# Patient Record
Sex: MALE | Race: OTHER | URBAN - METROPOLITAN AREA
[De-identification: names, ages, dates, MRNs, and addresses within clinical notes are randomized per-mention and may not be internally consistent; named-entity substitution may affect disease eponyms.]

---

## 2023-12-04 ENCOUNTER — EMERGENCY (EMERGENCY)
Facility: HOSPITAL | Age: 68
LOS: 0 days | Discharge: HOME | End: 2023-12-05
Attending: EMERGENCY MEDICINE
Payer: COMMERCIAL

## 2023-12-04 VITALS
SYSTOLIC BLOOD PRESSURE: 136 MMHG | RESPIRATION RATE: 18 BRPM | DIASTOLIC BLOOD PRESSURE: 60 MMHG | OXYGEN SATURATION: 98 % | HEART RATE: 100 BPM | TEMPERATURE: 98 F

## 2023-12-04 DIAGNOSIS — S43.005A UNSPECIFIED DISLOCATION OF LEFT SHOULDER JOINT, INITIAL ENCOUNTER: ICD-10-CM

## 2023-12-04 DIAGNOSIS — M25.512 PAIN IN LEFT SHOULDER: ICD-10-CM
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
## 2023-12-04 PROCEDURE — 96374 THER/PROPH/DIAG INJ IV PUSH: CPT | Mod: XU

## 2023-12-04 PROCEDURE — 73060 X-RAY EXAM OF HUMERUS: CPT | Mod: LT

## 2023-12-04 PROCEDURE — 99284 EMERGENCY DEPT VISIT MOD MDM: CPT | Mod: 25

## 2023-12-04 PROCEDURE — 96375 TX/PRO/DX INJ NEW DRUG ADDON: CPT | Mod: XU

## 2023-12-04 PROCEDURE — 73030 X-RAY EXAM OF SHOULDER: CPT | Mod: LT

## 2023-12-04 PROCEDURE — 23650 CLTX SHO DSLC W/MNPJ WO ANES: CPT | Mod: LT

## 2023-12-04 PROCEDURE — 99285 EMERGENCY DEPT VISIT HI MDM: CPT

## 2023-12-04 PROCEDURE — 73030 X-RAY EXAM OF SHOULDER: CPT | Mod: 26,LT

## 2023-12-04 PROCEDURE — 73060 X-RAY EXAM OF HUMERUS: CPT | Mod: 26,LT

## 2023-12-04 RX ORDER — IBUPROFEN 200 MG
1 TABLET ORAL
Qty: 30 | Refills: 0
Start: 2023-12-04

## 2023-12-04 RX ORDER — CYCLOBENZAPRINE HYDROCHLORIDE 10 MG/1
1 TABLET, FILM COATED ORAL
Qty: 15 | Refills: 0
Start: 2023-12-04

## 2023-12-04 RX ORDER — MORPHINE SULFATE 50 MG/1
4 CAPSULE, EXTENDED RELEASE ORAL ONCE
Refills: 0 | Status: DISCONTINUED | OUTPATIENT
Start: 2023-12-04 | End: 2023-12-04

## 2023-12-04 RX ADMIN — Medication 1 MILLIGRAM(S): at 13:26

## 2023-12-04 RX ADMIN — MORPHINE SULFATE 4 MILLIGRAM(S): 50 CAPSULE, EXTENDED RELEASE ORAL at 13:37

## 2023-12-04 NOTE — ED PROVIDER NOTE - ADDITIONAL NOTES AND INSTRUCTIONS:
Christiano Ulloa is medically excused from heavy lifting, manual labor, until otherwise cleared by follow-up doctor eval.

## 2023-12-04 NOTE — ED PROVIDER NOTE - NSFOLLOWUPINSTRUCTIONS_ED_ALL_ED_FT
Keep shoulder immobilizer on, in place, clean & DRY.  Ibuprofen 600 mg 1 tab with some food 3 x a day as needed for pain.  Pain medication every 6 hours as needed for extra pain.  Follow up with own orthopedist or Dr. Beaver.  Cyclobenzaprine 1 tab every 6 hours as needed for muscle spasm.      Shoulder Dislocation  Three images of the anatomy of the shoulder. One is normal. The other two are dislocated.  Your shoulder joint is made up of 3 bones:  The upper arm bone (humerus).  The shoulder blade (scapula).  The collarbone (clavicle).  A shoulder dislocation happens when your upper arm bone moves out of its normal place in your shoulder joint.    What are the causes?  This condition is often caused by:  A fall.  A hard, direct hit to the shoulder.  A forceful movement of the shoulder.  What increases the risk?  You are more likely to develop this condition if you play sports.    What are the signs or symptoms?  The upper body showing a dislocated shoulder.  Bad shape (deformity) of the shoulder.  Very bad pain.  A shoulder that you cannot move.  Numbness, weakness, or tingling in your neck or down your arm.  Bruising or swelling around your shoulder.  How is this treated?  This condition is treated with a procedure called a reduction. This is done to place the upper arm bone back in the joint. There are two types of reduction:  Closed reduction. The upper arm bone is placed back in the joint without surgery. The doctor uses his or her hands to guide the bone back into place.  Open reduction. Surgery is done to place the upper arm bone back in the joint. This may be needed if:  You have a weak shoulder joint or weak tissues that connect bones to each other (ligaments).  You have had more than one shoulder dislocation.  The nerves or blood vessels around your shoulder have been damaged.  After the procedure, you will wear a brace or sling to prevent the arm from moving.    After the brace or sling is removed, you will have physical therapy to help improve movement (range of motion) in your shoulder joint.    Follow these instructions at home:  Medicines    Take over-the-counter and prescription medicines only as told by your doctor.  Ask your doctor if the medicine prescribed to you:  Requires you to avoid driving or using machinery.  Can cause trouble pooping (constipation). You may need to take steps to prevent or treat trouble pooping:  Drink enough fluid to keep your pee (urine) pale yellow.  Take over-the-counter or prescription medicines.  Eat foods that are high in fiber. These include beans, whole grains, and fresh fruits and vegetables.  Limit foods that are high in fat and sugar. These include fried or sweet foods.  If you have a brace or sling:    Wear the brace or sling as told by your doctor. Remove it only as told by your doctor.  Loosen the brace or sling if your fingers:  Tingle.  Become numb.  Turn cold or blue.  Keep the brace or sling clean.  If the brace or sling is not waterproof:  Do not let it get wet.  Cover it with a watertight covering when you take a bath or shower.  Managing pain, stiffness, and swelling    Bag of ice on a towel on the skin.  If told, put ice on the injured area.  If you can remove your brace or sling, remove it as told by your doctor.  Put ice in a plastic bag.  Place a towel between your skin and the bag.  Leave the ice on for 20 minutes, 2–3 times per day.  Move your fingers often.  Raise (elevate) the injured area above the level of your heart while you are sitting or lying down.  Activity    Do not lift your arm above shoulder level until your doctor approves.  Do not lift anything until your doctor says that it is safe.  Do not push or pull things until your doctor approves.  Return to your normal activities as told by your doctor. Ask your doctor what activities are safe for you.  Do range-of-motion exercises only as told by your doctor.  Exercise your hand by squeezing a soft ball. This keeps your hand and wrist from getting stiff and swollen.  General instructions    Do not drive while you are wearing a brace or sling on a hand that you use for driving.  Do not take baths, swim, or use a hot tub until your doctor approves. Ask your doctor if you may take showers. You may only be allowed to take sponge baths.  Do not use any tobacco products, including cigarettes, chewing tobacco, or e-cigarettes. These can delay healing. If you need help quitting, ask your doctor.  Keep all follow-up visits as told by your doctor. This is important.  Contact a doctor if:  Your brace or sling gets damaged.  Get help right away if:  Your pain gets worse, not better.  You lose feeling in your arm or hand.  Your arm or hand turns white and cold.  Summary  A shoulder dislocation happens when your upper arm bone moves out of its normal place in your shoulder joint.  It is often caused by a fall, a strong hit to the shoulder, or a forceful movement of the shoulder.  It causes very bad pain. You may not be able to move your shoulder.  This condition is treated with either closed or open reduction. You will also be given a brace or sling. You will do exercises to improve movement in your shoulder joint.  Contact a doctor if your brace or sling gets damaged. Get help right away if your pain gets worse, you lose feeling in your arm or hand, or your arm or hand turns white or cold.  This information is not intended to replace advice given to you by your health care provider. Make sure you discuss any questions you have with your health care provider.

## 2023-12-04 NOTE — ED STATDOCS - BIRTH SEX
Health Maintenance Due   Topic Date Due    Shingles Vaccine (2 of 3) 07/22/2016    Eye Exam  03/21/2019    Influenza Vaccine (1) 08/01/2020     Updates were requested from care everywhere.  Chart was reviewed for overdue Proactive [facility] Encounters ([facility]ALEES[facility]) topics (CRS, Breast Cancer Screening, Eye exam)  Health Maintenance has been updated.  LINKS immunization registry triggered.  Immunizations were reconciled.       Male

## 2023-12-04 NOTE — ED STATDOCS - CLINICAL SUMMARY MEDICAL DECISION MAKING FREE TEXT BOX
Patient fell approximately 8-10 feet. Denies any LOC, no other complaints. Will XR shoulder and humerus and reassess.

## 2023-12-04 NOTE — ED PROVIDER NOTE - CARE PROVIDER_API CALL
Jeremy Beaver.  Orthopaedic Surgery  [address] [place] [address]  Phone: [phone]  Fax: [phone]  Follow Up Time:    Jeremy Beaver.  Orthopaedic Surgery  [address] [place] [address]  Phone: [phone]  Fax: [phone]  Follow Up Time:

## 2023-12-04 NOTE — ED PROVIDER NOTE - OBJECTIVE STATEMENT
69 yo male presents to the ED s/p fall from ladder 8-10 feet. Pt was coming down the ladder when he fell. Pt is left handed. Pt landed on his shoulder and is c/o left shoulder pain. Denies LOC. No other complaints at this time.      used, id# [id number] 67 yo male presents to the ED s/p fall from ladder 8-10 feet. Pt was coming down the ladder when he fell. Pt is left handed. Pt landed on his shoulder and is c/o left shoulder pain. Denies LOC. No other complaints at this time.      used, id# [id number]

## 2023-12-04 NOTE — ED ADULT NURSE NOTE - OBJECTIVE STATEMENT
fall from ladder 8-10 feet. Pt was coming down the ladder when he fell. Pt landed on his shoulder and is c/o left shoulder pain. Denies LOC    Extremity reduced bedside with orthopedics, subsequently placed into sling.

## 2023-12-04 NOTE — ED PROVIDER NOTE - PATIENT PORTAL LINK FT
You can access the FollowMyHealth Patient Portal offered by [facility] by registering at the following website: [email or web address]. By joining [employer]’s FollowMyHealth portal, you will also be able to view your health information using other applications (apps) compatible with our system. You can access the FollowMyHealth Patient Portal offered by [facility] by registering at the following website: [email or web address]. By joining [employer]’s FollowMyHealth portal, you will also be able to view your health information using other applications (apps) compatible with our system.

## 2023-12-04 NOTE — ED ADULT NURSE NOTE - NSFALLRISKINTERV_ED_ALL_ED
Communicate fall risk and risk factors to all staff, patient, and family/Provide visual cue: yellow wristband, yellow gown, etc/Reinforce activity limits and safety measures with patient and family/Call bell, personal items and telephone in reach/Instruct patient to call for assistance before getting out of bed/chair/stretcher/Non-slip footwear applied when patient is off stretcher/[place] to call system/Physically safe environment - no spills, clutter or unnecessary equipment/Purposeful Proactive Rounding/Room/bathroom lighting operational, light cord in reach Communicate fall risk and risk factors to all staff, patient, and family/Provide visual cue: yellow wristband, yellow gown, etc/Reinforce activity limits and safety measures with patient and family/Call bell, personal items and telephone in reach/Instruct patient to call for assistance before getting out of bed/chair/stretcher/Non-slip footwear applied when patient is off stretcher/[place] to call system/Physically safe environment - no spills, clutter or unnecessary equipment/Purposeful Proactive Rounding/Room/bathroom lighting operational, light cord in reach

## 2023-12-04 NOTE — ED STATDOCS - OBJECTIVE STATEMENT
67 y/o male with PMHx of HTN presents to the ED c/o left shoulder pain s/p fall from ladder outside of house, landing on his left shoulder. Patient is ambulatory, denies headstrike or blood thinner use. Denies any other pain. 69 y/o male with PMHx of HTN presents to the ED c/o left shoulder pain s/p fall from ladder outside of house, landing on his left shoulder. Patient is ambulatory, denies headstrike or blood thinner use. Denies any other pain.

## 2023-12-04 NOTE — ED STATDOCS - MUSCULOSKELETAL, MLM
+Tenderness in the LUE, none in the wrist or elbow. +Tenderness in the shoulder. Decreased ROM secondary to pain. NVI. Spine negative, lower extremities negative,

## 2023-12-04 NOTE — ED ADULT TRIAGE NOTE - CHIEF COMPLAINT QUOTE
Patient presents to the ER with complaints of fall from ladder outside of house. Patient ambulatory stating he fell on his left shoulder, pain to shoulder and left hip. Patient denies headstrike, denies blood thinner.

## 2023-12-04 NOTE — CONSULT NOTE ADULT - SUBJECTIVE AND OBJECTIVE BOX
68M RHD manual  presents with left shoulder pain after fall from ladder at work earlier today. 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 Patient denies headstrike or LOC. Patient noted immediate pain after the injury and inability to range the shoulder. The patient subsequently came to the ED for further evaluation and management. In the ED, the patient endorses pain of the shoulder and inability to range the shoulder. Patient denies numbness, tingling, weakness, or any other orthopaedic complaint.     Vital Signs Last 24 Hrs  T(C): 36.8 (04 Dec 2023 11:20), Max: 36.8 (04 Dec 2023 11:20)  T(F): 98.2 (04 Dec 2023 11:20), Max: 98.2 (04 Dec 2023 11:20)  HR: 100 (04 Dec 2023 11:20) (100 - 100)  BP: 136/60 (04 Dec 2023 11:20) (136/60 - 136/60)  BP(mean): --  RR: 18 (04 Dec 2023 11:20) (18 - 18)  SpO2: 98% (04 Dec 2023 11:20) (98% - 98%)    Parameters below as of 04 Dec 2023 11:20  Patient On (Oxygen Delivery Method): room air      Gen: Resting in bed, NAD  L UE:  Skin intact, + sulcus sign,   SILT radial/median/ulnar/axillary  +AIN/PIN/Ulnar/Radial/Musc/Median,   +elbow/wrist ROM,   shoulder ROM limited 2/2 pain,   +radial pulse, soft compartments.    Secondary Assessment:  NC/AT, NTTP of clavicles, NTTP of C-spine,T-spine, or L-spine in the midline and paraspinal areas; NTTP of pelvis  RUE: NTTP of Shoulder, Elbow, Wrist, Hand; NT with AROM/PROM of Shoulder, Elbow, Wrist, Hand; AIN/PIN/Med/Uln/Msc/Rad/Ax intact   LLE: Able to SLR, NT with Log Roll, NT with Heel Strike, NTTP of Hip, Knee, Ankle, Foot; NT with AROM/PROM of Hip, Knee, Ankle, Foot; Q/H/GSC/TA/EHL/FHL intact  RLE: Able to SLR, NT with Log Roll, NT with Heel Strike, NTTP of Hip, Knee, Ankle, Foot; NT with AROM/PROM of Hip, Knee, Ankle, Foot; Q/H/GSC/TA/EHL/FHL intact    Imaging: Xray imaging of the L shoulder reviewed demonstrating shoulder dislocation.     Procedure Note:  Under aspetic conditions, 10cc 1% lidocaine mixed with 10cc of sterile water was injected into the affected shoulder joint. Closed reduction was then performed and the affected extremity was immobilized. The patient tolerated the procedure well and there were no complications. The patient was neurovascularly intact following reduction. Post-reduction xrays demonstrated a reduced shoulder.    Assessment:  68yMale with L shoulder dislocation    Plan:   Imaging findings reviewed and discussed with the patient.   Shoulder reduced per procedure note above.   NWB LUE in shoulder immobilizer  Pain control PRN  No acute orthopaedic surgical intervention indicated at this time. This patient is orthopaedically stable for discharge.   Patient to follow up with Dr. Beaver as an outpatient for further evaluation and management.   All of the patient's questions and concerns were answered and addressed.  Will discuss with Dr. Beaver and advise of any changes to the plan.

## 2023-12-04 NOTE — ED PROVIDER NOTE - PROGRESS NOTE DETAILS
Freddie Odonnell for attending Dr. Kaba   Discussed with Dr. Beaver, will come and see pt. Freddie Odonnell for attending Dr. Kaba     Pt updated on results of XR. Pt lives in [place], will send flexeril. Discussed with ortho and will try to obtain MRI prior to discharge.    used, id#[id number] Freddie Odonnell for attending Dr. Kaba     Pt updated on results of XR. Pt lives in [place], will send flexeril. Discussed with ortho and will try to obtain MRI prior to discharge.    used, id#[id number] CC:  Pt seen at bedside by Ortho this AM.  MRI not able to be done  as ED pt unless admitted.  Ortho: pt not warranting admission, agrees MRI can be done as outpt & d/w pt about this.  Pt for DC, outpt Ortho f/u incl. MRI referral as outpt.

## 2023-12-05 VITALS
OXYGEN SATURATION: 96 % | HEART RATE: 85 BPM | DIASTOLIC BLOOD PRESSURE: 78 MMHG | RESPIRATION RATE: 18 BRPM | TEMPERATURE: 98 F | SYSTOLIC BLOOD PRESSURE: 140 MMHG

## 2023-12-05 RX ORDER — OXYCODONE AND ACETAMINOPHEN 5; 325 MG/1; MG/1
1 TABLET ORAL
Qty: 16 | Refills: 0
Start: 2023-12-05 | End: 2023-12-08
